# Patient Record
Sex: FEMALE | Race: ASIAN | NOT HISPANIC OR LATINO
[De-identification: names, ages, dates, MRNs, and addresses within clinical notes are randomized per-mention and may not be internally consistent; named-entity substitution may affect disease eponyms.]

---

## 2023-11-29 PROBLEM — Z00.00 ENCOUNTER FOR PREVENTIVE HEALTH EXAMINATION: Status: ACTIVE | Noted: 2023-11-29

## 2023-12-01 ENCOUNTER — APPOINTMENT (OUTPATIENT)
Dept: ANTEPARTUM | Facility: CLINIC | Age: 33
End: 2023-12-01
Payer: MEDICAID

## 2023-12-01 ENCOUNTER — ASOB RESULT (OUTPATIENT)
Age: 33
End: 2023-12-01

## 2023-12-01 PROCEDURE — 76813 OB US NUCHAL MEAS 1 GEST: CPT | Mod: 59

## 2023-12-01 PROCEDURE — 76801 OB US < 14 WKS SINGLE FETUS: CPT

## 2024-01-19 ENCOUNTER — ASOB RESULT (OUTPATIENT)
Age: 34
End: 2024-01-19

## 2024-01-19 ENCOUNTER — APPOINTMENT (OUTPATIENT)
Dept: ANTEPARTUM | Facility: CLINIC | Age: 34
End: 2024-01-19
Payer: MEDICAID

## 2024-01-19 PROCEDURE — 76805 OB US >/= 14 WKS SNGL FETUS: CPT

## 2024-06-08 ENCOUNTER — INPATIENT (INPATIENT)
Facility: HOSPITAL | Age: 34
LOS: 4 days | Discharge: HOME | DRG: 951 | End: 2024-06-13
Attending: OBSTETRICS & GYNECOLOGY | Admitting: OBSTETRICS & GYNECOLOGY
Payer: MEDICAID

## 2024-06-08 VITALS
WEIGHT: 154.1 LBS | DIASTOLIC BLOOD PRESSURE: 63 MMHG | OXYGEN SATURATION: 97 % | SYSTOLIC BLOOD PRESSURE: 102 MMHG | TEMPERATURE: 98 F | HEART RATE: 90 BPM | HEIGHT: 60 IN | RESPIRATION RATE: 18 BRPM

## 2024-06-08 DIAGNOSIS — Z34.80 ENCOUNTER FOR SUPERVISION OF OTHER NORMAL PREGNANCY, UNSPECIFIED TRIMESTER: ICD-10-CM

## 2024-06-08 DIAGNOSIS — O26.899 OTHER SPECIFIED PREGNANCY RELATED CONDITIONS, UNSPECIFIED TRIMESTER: ICD-10-CM

## 2024-06-08 LAB
APTT BLD: 26.2 SEC — SIGNIFICANT CHANGE UP (ref 24.5–35.6)
BASOPHILS # BLD AUTO: 0.03 K/UL — SIGNIFICANT CHANGE UP (ref 0–0.2)
BASOPHILS NFR BLD AUTO: 0.4 % — SIGNIFICANT CHANGE UP (ref 0–2)
BLD GP AB SCN SERPL QL: SIGNIFICANT CHANGE UP
EOSINOPHIL # BLD AUTO: 0.14 K/UL — SIGNIFICANT CHANGE UP (ref 0–0.5)
EOSINOPHIL NFR BLD AUTO: 1.9 % — SIGNIFICANT CHANGE UP (ref 0–6)
FIBRINOGEN PPP-MCNC: 356 MG/DL — SIGNIFICANT CHANGE UP (ref 200–475)
HCT VFR BLD CALC: 37.2 % — SIGNIFICANT CHANGE UP (ref 34.5–45)
HGB BLD-MCNC: 12.4 G/DL — SIGNIFICANT CHANGE UP (ref 11.5–15.5)
IMM GRANULOCYTES NFR BLD AUTO: 1.2 % — HIGH (ref 0–0.9)
INR BLD: 0.88 RATIO — SIGNIFICANT CHANGE UP (ref 0.85–1.18)
LYMPHOCYTES # BLD AUTO: 1.56 K/UL — SIGNIFICANT CHANGE UP (ref 1–3.3)
LYMPHOCYTES # BLD AUTO: 21.5 % — SIGNIFICANT CHANGE UP (ref 13–44)
MCHC RBC-ENTMCNC: 30.5 PG — SIGNIFICANT CHANGE UP (ref 27–34)
MCHC RBC-ENTMCNC: 33.3 GM/DL — SIGNIFICANT CHANGE UP (ref 32–36)
MCV RBC AUTO: 91.6 FL — SIGNIFICANT CHANGE UP (ref 80–100)
MONOCYTES # BLD AUTO: 0.42 K/UL — SIGNIFICANT CHANGE UP (ref 0–0.9)
MONOCYTES NFR BLD AUTO: 5.8 % — SIGNIFICANT CHANGE UP (ref 2–14)
NEUTROPHILS # BLD AUTO: 5.02 K/UL — SIGNIFICANT CHANGE UP (ref 1.8–7.4)
NEUTROPHILS NFR BLD AUTO: 69.2 % — SIGNIFICANT CHANGE UP (ref 43–77)
NRBC # BLD: 0 /100 WBCS — SIGNIFICANT CHANGE UP (ref 0–0)
PLATELET # BLD AUTO: 167 K/UL — SIGNIFICANT CHANGE UP (ref 150–400)
PROTHROM AB SERPL-ACNC: 10.1 SEC — SIGNIFICANT CHANGE UP (ref 9.5–13)
RBC # BLD: 4.06 M/UL — SIGNIFICANT CHANGE UP (ref 3.8–5.2)
RBC # FLD: 14.5 % — SIGNIFICANT CHANGE UP (ref 10.3–14.5)
WBC # BLD: 7.26 K/UL — SIGNIFICANT CHANGE UP (ref 3.8–10.5)
WBC # FLD AUTO: 7.26 K/UL — SIGNIFICANT CHANGE UP (ref 3.8–10.5)

## 2024-06-08 RX ORDER — CHLORHEXIDINE GLUCONATE 213 G/1000ML
1 SOLUTION TOPICAL DAILY
Refills: 0 | Status: DISCONTINUED | OUTPATIENT
Start: 2024-06-08 | End: 2024-06-10

## 2024-06-08 RX ORDER — CITRIC ACID/SODIUM CITRATE 300-500 MG
15 SOLUTION, ORAL ORAL EVERY 6 HOURS
Refills: 0 | Status: DISCONTINUED | OUTPATIENT
Start: 2024-06-08 | End: 2024-06-10

## 2024-06-08 RX ORDER — SODIUM CHLORIDE 9 MG/ML
1000 INJECTION, SOLUTION INTRAVENOUS
Refills: 0 | Status: DISCONTINUED | OUTPATIENT
Start: 2024-06-08 | End: 2024-06-10

## 2024-06-08 RX ORDER — OXYTOCIN 10 UNIT/ML
333.33 VIAL (ML) INJECTION
Qty: 20 | Refills: 0 | Status: COMPLETED | OUTPATIENT
Start: 2024-06-08

## 2024-06-08 NOTE — OB RN PATIENT PROFILE - FALL HARM RISK - UNIVERSAL INTERVENTIONS
Pt call requesting medication clearance because pt. Have a dentist frederick. This coming Thursday 03/16/2023    septocaine with epinephrine  Mepivacaine with  Epinephrine    Esperanza Hernandez Dentist    [address] [place] [address] [address] [facility]  [phone]  Fax [phone]    Please send the clearance before the frederick. Date. Bed in lowest position, wheels locked, appropriate side rails in place/Call bell, personal items and telephone in reach/Instruct patient to call for assistance before getting out of bed or chair/Non-slip footwear when patient is out of bed/[place] to call system/Physically safe environment - no spills, clutter or unnecessary equipment/Purposeful Proactive Rounding/Room/bathroom lighting operational, light cord in reach

## 2024-06-08 NOTE — OB PROVIDER H&P - HISTORY OF PRESENT ILLNESS
32 YO  at 40w5d LMP: 24 CORA: 6/3/24 presents for scheduled IOL due to late term pregnancy.  Pt notes +FM. Denies VB, LOF, CTX, HA, vision changes, Chest pain, SOB, epigastric pain, edema, N/V/D, or RUQ pain.    Prenatal care: [facility]   Complicated by   1. Anemia   - Pt taking PO iron    History  OBHx: Denies  GYNHx: Denies fibroids, cysts, STDs, or abnormal pap smears  PMH: Denies  Meds: PNV, iron   PSHx: Denies  Allergies: NKA  Social: Denies tobacco, EtOH, and drug use  Psych: Denies anxiety, depression, PTSD, or previous suicide attempts. Pt feels safe at home.

## 2024-06-08 NOTE — OB PROVIDER H&P - NSHPPHYSICALEXAM_GEN_ALL_CORE
Vital Signs Last 24 Hrs  T(C): 36.8 (08 Jun 2024 10:57), Max: 36.8 (08 Jun 2024 10:57)  T(F): 98.2 (08 Jun 2024 10:57), Max: 98.2 (08 Jun 2024 10:57)  HR: 90 (08 Jun 2024 10:57) (90 - 90)  BP: 102/63 (08 Jun 2024 10:57) (102/63 - 102/63)  BP(mean): --  RR: 18 (08 Jun 2024 10:57) (18 - 18)  SpO2: 97% (08 Jun 2024 10:57) (97% - 97%)    Parameters below as of 08 Jun 2024 10:57  Patient On (Oxygen Delivery Method): room air      General: Pt resting comfortably, NAD  Abd: Gravid, soft, non-tender  Ext: Soft, non-tender, no edema  SVE: 0/0/-3   NST: Baseline 140, moderate variability, + accels, no decels   TOCO: No contractions  Sono: Bedside sono shows vertex position

## 2024-06-08 NOTE — OB PROVIDER H&P - ASSESSMENT
32 yo f  at 40w5d admitted for MIOL due to late term pregnancy, pt is stable     - NST reviewed   - Continuous maternal and fetal monitoring  - Admission labs ordered   - Informed consent obtained at the bedside by Dr. Cui  - Pain management options discussed   - Po cytotec protocol     D/W Dr. Galindo

## 2024-06-08 NOTE — OB RN PATIENT PROFILE - FUNCTIONAL ASSESSMENT - BASIC MOBILITY 3.
[place]msler grid at home. MVI/AREDS discussed. Patient to call if any changes in vision or grid card. 4 = No assist / stand by assistance

## 2024-06-08 NOTE — OB RN PATIENT PROFILE - NS_ADMITVITALS_OBGYN_ALL_OB_DT
Anesthesia Type: 1% lidocaine with epinephrine and a 1:10 solution of 8.4% sodium bicarbonate 08-Jun-2024 10:30

## 2024-06-09 LAB
ABO RH CONFIRMATION: SIGNIFICANT CHANGE UP
RUBV IGG SER-ACNC: 4.3 INDEX — SIGNIFICANT CHANGE UP
RUBV IGG SER-IMP: POSITIVE — SIGNIFICANT CHANGE UP
T PALLIDUM AB TITR SER: NEGATIVE — SIGNIFICANT CHANGE UP

## 2024-06-09 RX ORDER — OXYTOCIN 10 UNIT/ML
VIAL (ML) INJECTION
Qty: 30 | Refills: 0 | Status: DISCONTINUED | OUTPATIENT
Start: 2024-06-09 | End: 2024-06-10

## 2024-06-09 RX ORDER — SODIUM CHLORIDE 9 MG/ML
500 INJECTION, SOLUTION INTRAVENOUS
Refills: 0 | Status: DISCONTINUED | OUTPATIENT
Start: 2024-06-09 | End: 2024-06-09

## 2024-06-09 RX ADMIN — Medication 2 MILLIUNIT(S)/MIN: at 22:44

## 2024-06-09 RX ADMIN — SODIUM CHLORIDE 125 MILLILITER(S): 9 INJECTION, SOLUTION INTRAVENOUS at 01:35

## 2024-06-09 RX ADMIN — SODIUM CHLORIDE 125 MILLILITER(S): 9 INJECTION, SOLUTION INTRAVENOUS at 13:22

## 2024-06-09 RX ADMIN — SODIUM CHLORIDE 125 MILLILITER(S): 9 INJECTION, SOLUTION INTRAVENOUS at 07:02

## 2024-06-09 NOTE — OB PROVIDER LABOR PROGRESS NOTE - NS_SUBJECTIVE/OBJECTIVE_OBGYN_ALL_OB_FT
Patient states she is doing well. Agreeable to continuing vaginal cytotec    Vital Signs Last 24 Hrs  T(C): 37.4 (09 Jun 2024 19:49), Max: 37.4 (09 Jun 2024 19:47)  T(F): 99.3 (09 Jun 2024 19:49), Max: 99.3 (09 Jun 2024 19:47)  HR: 75 (09 Jun 2024 01:21) (75 - 75)  BP: 109/64 (09 Jun 2024 01:21) (109/64 - 109/64)  BP(mean): --  RR: 18 (09 Jun 2024 01:21) (18 - 18)  SpO2: 94% (09 Jun 2024 01:21) (94% - 94%)    Parameters below as of 09 Jun 2024 01:21  Patient On (Oxygen Delivery Method): room air    Tracing: baseline 145 bpm, moderate variability, + accels , no decels   toco: q4 Patient states she is doing well. Agreeable to continuing vaginal cytotec    Vital Signs Last 24 Hrs  T(C): 37.4 (09 Jun 2024 19:49), Max: 37.4 (09 Jun 2024 19:47)  T(F): 99.3 (09 Jun 2024 19:49), Max: 99.3 (09 Jun 2024 19:47)  HR: 75 (09 Jun 2024 01:21) (75 - 75)  BP: 109/64 (09 Jun 2024 01:21) (109/64 - 109/64)  BP(mean): --  RR: 18 (09 Jun 2024 01:21) (18 - 18)  SpO2: 94% (09 Jun 2024 01:21) (94% - 94%)    Parameters below as of 09 Jun 2024 01:21  Patient On (Oxygen Delivery Method): room air    Tracing: baseline 140 bpm, moderate variability, no decels   toco: q4

## 2024-06-09 NOTE — OB PROVIDER LABOR PROGRESS NOTE - NS_SUBJECTIVE/OBJECTIVE_OBGYN_ALL_OB_FT
patient seen at bedside  pain well controlled.  bp 97/81 hr 59  sve: external oz 2cm, internal closed, 70%,-3

## 2024-06-09 NOTE — OB PROVIDER LABOR PROGRESS NOTE - NS_SUBJECTIVE/OBJECTIVE_OBGYN_ALL_OB_FT
Patient evaluated at bedside, resting comfortably with epidural in place   Denies any complaints at this time   Discussed cervical ripening balloon, answered patient questions     VE deferred at this time   Vital signs stable

## 2024-06-09 NOTE — OB PROVIDER LABOR PROGRESS NOTE - NS_SUBJECTIVE/OBJECTIVE_OBGYN_ALL_OB_FT
Patient evaluated at bedside, resting comfortably with epidural   Denies complaints at this time    VE: 2/70/-3    Attempted cervical ripening balloon   Unable to place due to tight internal os   Patient tolerate procedure well

## 2024-06-09 NOTE — OB PROVIDER LABOR PROGRESS NOTE - NS_SUBJECTIVE/OBJECTIVE_OBGYN_ALL_OB_FT
Patient resting comfortably     SVE: 2/70/-3  Vaginal cytotec #1 placed  Patient tolerated procedure well

## 2024-06-09 NOTE — OB PROVIDER LABOR PROGRESS NOTE - NS_SUBJECTIVE/OBJECTIVE_OBGYN_ALL_OB_FT
Patient seen resting comfortably.  No complaints.  Does not require pain management at this time.     VE: Deferred

## 2024-06-09 NOTE — OB PROVIDER LABOR PROGRESS NOTE - NS_SUBJECTIVE/OBJECTIVE_OBGYN_ALL_OB_FT
Patient evaluated at bedside, resting comfortably   Denies any complaints at this time     VE deferred at this time   Vital signs stable

## 2024-06-10 LAB
APTT BLD: 30.1 SEC — SIGNIFICANT CHANGE UP (ref 24.5–35.6)
HCT VFR BLD CALC: 25.3 % — LOW (ref 34.5–45)
HGB BLD-MCNC: 8.5 G/DL — LOW (ref 11.5–15.5)
INR BLD: 1.01 RATIO — SIGNIFICANT CHANGE UP (ref 0.85–1.18)
MCHC RBC-ENTMCNC: 31.5 PG — SIGNIFICANT CHANGE UP (ref 27–34)
MCHC RBC-ENTMCNC: 33.6 GM/DL — SIGNIFICANT CHANGE UP (ref 32–36)
MCV RBC AUTO: 93.7 FL — SIGNIFICANT CHANGE UP (ref 80–100)
NRBC # BLD: 0 /100 WBCS — SIGNIFICANT CHANGE UP (ref 0–0)
PLATELET # BLD AUTO: 138 K/UL — LOW (ref 150–400)
PROTHROM AB SERPL-ACNC: 11.5 SEC — SIGNIFICANT CHANGE UP (ref 9.5–13)
RBC # BLD: 2.7 M/UL — LOW (ref 3.8–5.2)
RBC # FLD: 14.3 % — SIGNIFICANT CHANGE UP (ref 10.3–14.5)
WBC # BLD: 13.26 K/UL — HIGH (ref 3.8–10.5)
WBC # FLD AUTO: 13.26 K/UL — HIGH (ref 3.8–10.5)

## 2024-06-10 RX ORDER — DIPHENHYDRAMINE HCL 50 MG
25 CAPSULE ORAL EVERY 6 HOURS
Refills: 0 | Status: DISCONTINUED | OUTPATIENT
Start: 2024-06-10 | End: 2024-06-13

## 2024-06-10 RX ORDER — OXYTOCIN 10 UNIT/ML
333.33 VIAL (ML) INJECTION
Qty: 20 | Refills: 0 | Status: DISCONTINUED | OUTPATIENT
Start: 2024-06-10 | End: 2024-06-13

## 2024-06-10 RX ORDER — CITRIC ACID/SODIUM CITRATE 300-500 MG
20 SOLUTION, ORAL ORAL ONCE
Refills: 0 | Status: DISCONTINUED | OUTPATIENT
Start: 2024-06-10 | End: 2024-06-13

## 2024-06-10 RX ORDER — KETOROLAC TROMETHAMINE 30 MG/ML
30 SYRINGE (ML) INJECTION EVERY 6 HOURS
Refills: 0 | Status: DISCONTINUED | OUTPATIENT
Start: 2024-06-10 | End: 2024-06-11

## 2024-06-10 RX ORDER — FERROUS SULFATE 325(65) MG
325 TABLET ORAL DAILY
Refills: 0 | Status: DISCONTINUED | OUTPATIENT
Start: 2024-06-11 | End: 2024-06-13

## 2024-06-10 RX ORDER — FAMOTIDINE 10 MG/ML
20 INJECTION INTRAVENOUS ONCE
Refills: 0 | Status: COMPLETED | OUTPATIENT
Start: 2024-06-10 | End: 2024-06-10

## 2024-06-10 RX ORDER — CEFAZOLIN SODIUM 1 G
2000 VIAL (EA) INJECTION ONCE
Refills: 0 | Status: COMPLETED | OUTPATIENT
Start: 2024-06-10 | End: 2024-06-10

## 2024-06-10 RX ORDER — MAGNESIUM HYDROXIDE 400 MG/1
30 TABLET, CHEWABLE ORAL
Refills: 0 | Status: DISCONTINUED | OUTPATIENT
Start: 2024-06-10 | End: 2024-06-13

## 2024-06-10 RX ORDER — SODIUM CHLORIDE 9 MG/ML
500 INJECTION INTRAMUSCULAR; INTRAVENOUS; SUBCUTANEOUS ONCE
Refills: 0 | Status: COMPLETED | OUTPATIENT
Start: 2024-06-10 | End: 2024-06-10

## 2024-06-10 RX ORDER — IBUPROFEN 200 MG
600 TABLET ORAL EVERY 6 HOURS
Refills: 0 | Status: COMPLETED | OUTPATIENT
Start: 2024-06-10 | End: 2025-05-09

## 2024-06-10 RX ORDER — TETANUS TOXOID, REDUCED DIPHTHERIA TOXOID AND ACELLULAR PERTUSSIS VACCINE, ADSORBED 5; 2.5; 8; 8; 2.5 [IU]/.5ML; [IU]/.5ML; UG/.5ML; UG/.5ML; UG/.5ML
0.5 SUSPENSION INTRAMUSCULAR ONCE
Refills: 0 | Status: DISCONTINUED | OUTPATIENT
Start: 2024-06-10 | End: 2024-06-13

## 2024-06-10 RX ORDER — ONDANSETRON 8 MG/1
4 TABLET, FILM COATED ORAL ONCE
Refills: 0 | Status: COMPLETED | OUTPATIENT
Start: 2024-06-10 | End: 2024-06-10

## 2024-06-10 RX ORDER — LANOLIN
1 OINTMENT (GRAM) TOPICAL EVERY 6 HOURS
Refills: 0 | Status: DISCONTINUED | OUTPATIENT
Start: 2024-06-10 | End: 2024-06-13

## 2024-06-10 RX ORDER — OXYCODONE HYDROCHLORIDE 5 MG/1
5 TABLET ORAL
Refills: 0 | Status: COMPLETED | OUTPATIENT
Start: 2024-06-10 | End: 2024-06-17

## 2024-06-10 RX ORDER — OXYCODONE HYDROCHLORIDE 5 MG/1
5 TABLET ORAL ONCE
Refills: 0 | Status: DISCONTINUED | OUTPATIENT
Start: 2024-06-10 | End: 2024-06-13

## 2024-06-10 RX ORDER — ENOXAPARIN SODIUM 100 MG/ML
40 INJECTION SUBCUTANEOUS EVERY 24 HOURS
Refills: 0 | Status: DISCONTINUED | OUTPATIENT
Start: 2024-06-11 | End: 2024-06-13

## 2024-06-10 RX ORDER — OXYTOCIN 10 UNIT/ML
333.33 VIAL (ML) INJECTION
Qty: 20 | Refills: 0 | Status: COMPLETED | OUTPATIENT
Start: 2024-06-10 | End: 2024-06-10

## 2024-06-10 RX ORDER — SIMETHICONE 80 MG/1
80 TABLET, CHEWABLE ORAL EVERY 4 HOURS
Refills: 0 | Status: DISCONTINUED | OUTPATIENT
Start: 2024-06-10 | End: 2024-06-13

## 2024-06-10 RX ORDER — SODIUM CHLORIDE 9 MG/ML
1000 INJECTION, SOLUTION INTRAVENOUS
Refills: 0 | Status: DISCONTINUED | OUTPATIENT
Start: 2024-06-10 | End: 2024-06-13

## 2024-06-10 RX ORDER — HYDROMORPHONE HYDROCHLORIDE 2 MG/ML
0.5 INJECTION INTRAMUSCULAR; INTRAVENOUS; SUBCUTANEOUS ONCE
Refills: 0 | Status: DISCONTINUED | OUTPATIENT
Start: 2024-06-10 | End: 2024-06-10

## 2024-06-10 RX ORDER — AZITHROMYCIN 500 MG/1
500 TABLET, FILM COATED ORAL ONCE
Refills: 0 | Status: COMPLETED | OUTPATIENT
Start: 2024-06-10 | End: 2024-06-10

## 2024-06-10 RX ORDER — ACETAMINOPHEN 500 MG
1000 TABLET ORAL ONCE
Refills: 0 | Status: COMPLETED | OUTPATIENT
Start: 2024-06-10 | End: 2024-06-10

## 2024-06-10 RX ORDER — ACETAMINOPHEN 500 MG
975 TABLET ORAL
Refills: 0 | Status: DISCONTINUED | OUTPATIENT
Start: 2024-06-10 | End: 2024-06-13

## 2024-06-10 RX ORDER — FOLIC ACID 0.8 MG
1 TABLET ORAL DAILY
Refills: 0 | Status: DISCONTINUED | OUTPATIENT
Start: 2024-06-11 | End: 2024-06-13

## 2024-06-10 RX ADMIN — SODIUM CHLORIDE 500 MILLILITER(S): 9 INJECTION INTRAMUSCULAR; INTRAVENOUS; SUBCUTANEOUS at 18:00

## 2024-06-10 RX ADMIN — Medication 15 MILLILITER(S): at 11:33

## 2024-06-10 RX ADMIN — ONDANSETRON 4 MILLIGRAM(S): 8 TABLET, FILM COATED ORAL at 12:24

## 2024-06-10 RX ADMIN — HYDROMORPHONE HYDROCHLORIDE 0.5 MILLIGRAM(S): 2 INJECTION INTRAMUSCULAR; INTRAVENOUS; SUBCUTANEOUS at 18:24

## 2024-06-10 RX ADMIN — SODIUM CHLORIDE 125 MILLILITER(S): 9 INJECTION, SOLUTION INTRAVENOUS at 01:24

## 2024-06-10 RX ADMIN — Medication 100 MILLIGRAM(S): at 13:36

## 2024-06-10 RX ADMIN — FAMOTIDINE 20 MILLIGRAM(S): 10 INJECTION INTRAVENOUS at 11:33

## 2024-06-10 RX ADMIN — CHLORHEXIDINE GLUCONATE 1 APPLICATION(S): 213 SOLUTION TOPICAL at 11:33

## 2024-06-10 RX ADMIN — Medication 30 MILLIGRAM(S): at 15:00

## 2024-06-10 RX ADMIN — AZITHROMYCIN 255 MILLIGRAM(S): 500 TABLET, FILM COATED ORAL at 12:07

## 2024-06-10 RX ADMIN — HYDROMORPHONE HYDROCHLORIDE 0.5 MILLIGRAM(S): 2 INJECTION INTRAMUSCULAR; INTRAVENOUS; SUBCUTANEOUS at 18:03

## 2024-06-10 RX ADMIN — HYDROMORPHONE HYDROCHLORIDE 0.5 MILLIGRAM(S): 2 INJECTION INTRAMUSCULAR; INTRAVENOUS; SUBCUTANEOUS at 20:36

## 2024-06-10 RX ADMIN — Medication 1000 MILLIGRAM(S): at 17:32

## 2024-06-10 RX ADMIN — Medication 1000 MILLIUNIT(S)/MIN: at 16:50

## 2024-06-10 RX ADMIN — Medication 0.2 MILLIGRAM(S): at 17:58

## 2024-06-10 RX ADMIN — SODIUM CHLORIDE 125 MILLILITER(S): 9 INJECTION, SOLUTION INTRAVENOUS at 04:51

## 2024-06-10 RX ADMIN — Medication 30 MILLIGRAM(S): at 23:42

## 2024-06-10 RX ADMIN — Medication 400 MILLIGRAM(S): at 16:50

## 2024-06-10 NOTE — CHART NOTE - NSCHARTNOTEFT_GEN_A_CORE
MARLON THOMPSON Event Note     Pt reevaluated; Anesthesia  giving ephedrine for hypotension  90/70's, and agrees to give diluaded .5 for pain  reevaluated at bedside; fundus below umbilicus well contracted  and lochia now min to moderate  cbc dropped 8.5/25  continue blood transfusion    T(C): 37.4 (06-10-24 @ 15:30), Max: 37.4 (06-09-24 @ 19:47)  HR: --  BP: 112/97 (06-10-24 @ 16:00) (75/47 - 112/97)  bp 98/71   RR: 16 (06-10-24 @ 16:00) (16 - 16)  SpO2: 100% (06-10-24 @ 16:00) (100% - 100%)  Wt(kg): --    Gen: A&Ox 3, NAD  Abdomen: +BS; Soft, nontender, ND; Fundus firm below umbilicus  Gyn: Min lochia  Ext: Nontender, DTRS 2+, no worsening edema     notified   notified  stevie

## 2024-06-10 NOTE — OB PROVIDER LABOR PROGRESS NOTE - ASSESSMENT
32 yo  @ 40.6 weeks, miol for post term     - continue close maternal and fetal monitoring   - pain management with epidural   - continue PO cytotec protocol   - possible CRB  - NST reviewed   - Dr. Galindo notified 
34 y/o  @ 40 weeks 6 days miol post dates s/p PO cytotec , failed balloon, vaginal cytotec.   CAT I     -continue to monitor vitals   - continue fetal/maternal monitoring   - epidural management per anesthesia   - continue vaginal cytotec   -  d/w Dr. Galindo   
Patient undergoing MIOL for post dates    -Continue monitoring fht, toco, vitals  -Pain management as needed  -PO cytotec protocol  -Continue current care    Dr Galindo aware 
32 yo  @ 40.6 weeks, miol for post term     - continue close maternal and fetal monitoring   - pain management per patient request  - continue PO cytotec protocol   - NST reviewed   - Dr. Galindo notified 
34 yo f  at 40w5d admitted for MIOL due to late term pregnancy, pt is stable     - NST reviewed   - maternal and fetal monitoring  - Pain management options discussed   - Po cytotec protocol     D/W Dr. Galindo
a/p siup @ 40w6d miol for post dates  - d/w Dr Galindo, will d/c vaginal cytotec protocol and start pitocin augmentation once cat 1 tracing.   - 2 units prbcs available  - lateral side  - D5LR  - continuous maternal/fetal monitoring  nst reviewed by Dr Henry, house attending
nst cat I  continue po cytotec  nst reviewed with dr.Rimarachin hubbard
a/p siup @ 40w6d miol for post dates  - pitocin augmentation per protocol  - 2 units prbcs available  - lateral side  - D5LR  - continuous maternal/fetal monitoring  nst reviewed by Dr Henry, kim attending
34 yo  @ 40.6 weeks, miol for post term     - continue close maternal and fetal monitoring   - pain management with epidural   - continue PO cytotec protocol   - possible re-attempt CRB  - re-evaluate after PO cytotec finished   - NST reviewed   - discussed with Dr. Galindo 
a/p siup @ 40w6d miol for post dates  -  continue pitocin augmentation per protocol  - 2 units prbcs available  - lateral side/peanut ball  - D5LR  - continuous maternal/fetal monitoring  nst reviewed by kim Coyne attending  d/w Dr Galindo
cat I   contineu pitocin titration  pt seen at bedside with dr.Spryopolous Finley
Patient undergoing mIOL for post dates    -Continue monitoring fht, toco, vitals  -Pain management as needed  -Continue PO cytotec   -Continue current care    Dr Galindo aware 
34 yo  @ 40.6 weeks, miol for post term   Patient s/p PO cytotec protocol   Minimal cervical dilation   s/p vaginal cytotec #1   Will reevaluate after 2-3 cytotec     - continue close maternal and fetal monitoring   - pain management with epidural   - vaginal cytotec   - NST reviewed   - Dr. Galindo aware

## 2024-06-10 NOTE — OB PROVIDER DELIVERY SUMMARY - NSPROVIDERDELIVERYNOTE_OBGYN_ALL_OB_FT
ml  IV 2000 ml   urine output 400 ml clear at the end of the procedure  Ascites  right and left adnexa normal   uterus gravid , intact,  alive male fetus, cephalic, left OP position, clear fluid, apgar 9/9,   delayed cord clamping done  Placenta complete looking , 3 vessel cord  uterus contracted  specimen : cord gas , cord gases

## 2024-06-10 NOTE — OB PROVIDER LABOR PROGRESS NOTE - NS_OBIHICONTRACTIONPATTERNDETAILS_OBGYN_ALL_OB_FT
q 2 minutes
q4-5
Irregular contractions
q 2-3 minutes
q irregular
q3
no contrations
q 3 minutes
toco q irregular
toco q irregular
q 3-5 minutes
Contractions q 2-3 mins

## 2024-06-10 NOTE — OB PROVIDER LABOR PROGRESS NOTE - NSVAGINALEXAM_OBGYN_ALL_OB_DT
09-Jun-2024 06:18
09-Jun-2024 13:10
10-Oliver-2024
09-Jun-2024 18:30
10-Oliver-2024
10-Oliver-2024 09:17

## 2024-06-10 NOTE — OB RN DELIVERY SUMMARY - BABY A: APGAR 5 MIN RESP RATE, DELIVERY
Outpatient Physical Therapy Ortho Treatment Note  [facility]     Patient Name: Gayatri Bianchi  : 1953  MRN: [id number]  Today's Date: 2018      Visit Date: 2018    Visit Dx:    ICD-10-CM ICD-9-CM   1. Heel pain, chronic, right M79.671 729.5    G89.29 338.29       There is no problem list on file for this patient.       No past medical history on file.     Past Surgical History:   Procedure Laterality Date   • BREAST BIOPSY     • HYSTERECTOMY     • OOPHORECTOMY Right                              PT Assessment/Plan     Row Name 18 1214          PT Assessment    Assessment Comments Her R gastroc muscle tone has improved and today I performed measurements of the B gastro muscle girths 6 inches from the medial femoral condyles and her R LE was 1/16th of an inch bigger.  -EC        PT Plan    PT Plan Comments Review all goals and place on hold as she will be out of town for two weeks, perform a progress note in case she returns post 2 week hiatus.  -EC       User Key  (r) = Recorded By, (t) = Taken By, (c) = Cosigned By    Initials Name Provider Type    EC Germán Pérez PTA Physical Therapy Assistant                Modalities     Row Name 18 0900             Subjective Pain    Able to rate subjective pain? yes  -EC      Pre-Treatment Pain Level 0  -EC         ELECTRICAL STIMULATION    Attended/Unattended Attended  -EC      Stimulation Type --   Laser stim Chronic Inflammation   -EC      Location/Electrode Placement/Other R medial/lateral ankle and achilles tendon   -EC      16172 - PT Electrical Stimulation (Manual) Minutes 15  -EC        User Key  (r) = Recorded By, (t) = Taken By, (c) = Cosigned By    Initials Name Provider Type    EC Germán Pérez PTA Physical Therapy Assistant                Exercises     Row Name 18 0900             Subjective Comments    Subjective Comments She reports she had a little pain after wearing sandals the other day  -EC         Subjective  Pain    Able to rate subjective pain? yes  -EC      Pre-Treatment Pain Level 0  -EC      Post-Treatment Pain Level 0  -EC         Total Minutes    71335 - PT Manual Therapy Minutes 30  -EC        User Key  (r) = Recorded By, (t) = Taken By, (c) = Cosigned By    Initials Name Provider Type    WILVER Pérez RICCI Physical Therapy Assistant                        Manual Rx (last 36 hours)      Manual Treatments     Row Name 08/01/18 0900             Total Minutes    18881 - PT Manual Therapy Minutes 30  -EC        User Key  (r) = Recorded By, (t) = Taken By, (c) = Cosigned By    Initials Name Provider Type    WILVER Pérez, RICCI Physical Therapy Assistant                PT OP Goals     Row Name 08/01/18 0900          PT Short Term Goals    STG Date to Achieve 07/27/18  -EC     STG 1 Patient will be able to maintain SLS on firm surface with eyes closed for >30 seconds without trendelenburg sign.  -EC     STG 1 Progress Ongoing  -EC     STG 2 Patient will demonstrate at least 4+/5 strength in hip abduction, bilaterally.  -EC     STG 2 Progress Ongoing  -EC     STG 3 Patient will score 85% or higher on the Lower Extremity Functional Scale.  -EC     STG 3 Progress Ongoing  -EC        Long Term Goals    LTG Date to Achieve 08/12/18  -EC     LTG 1 Patient will be independent in a comprehensive HEP.  -EC     LTG 1 Progress Ongoing  -EC     LTG 1 Progress Comments verbalized compliance  -EC     LTG 2 Patient will be able to ascend/descend a flight of stairs with reciprocal pattern without right heel pain.  -EC     LTG 2 Progress Ongoing  -EC     LTG 3 Patient will ambulate without right hip drop/lateral pelvic drift with midstance   -EC     LTG 3 Progress Ongoing  -EC     LTG 4 Patient will be able to perform 20 (R) active heel raises through full range with symptoms <3/10  -EC     LTG 4 Progress Ongoing  -EC     LTG 5 Patient will be able to negotiate inclines/declines on even/uneven terrain with (R) heel symptoms  <3/10  -EC     LTG 5 Progress Ongoing  -EC       User Key  (r) = Recorded By, (t) = Taken By, (c) = Cosigned By    Initials Name Provider Type    EC Germán Pérez PTA Physical Therapy Assistant          Therapy Education  Given: HEP, Symptoms/condition management  Program: Reinforced  How Provided: Verbal  Provided to: Patient  Level of Understanding: Verbalized              Time Calculation:      Therapy Suggested Charges     Code   Minutes Charges    70752 (CPT®) Hc Pt Neuromusc Re Education Ea 15 Min      51478 (CPT®) Hc Pt Ther Proc Ea 15 Min      64291 (CPT®) Hc Gait Training Ea 15 Min      62766 (CPT®) Hc Pt Therapeutic Act Ea 15 Min      47277 (CPT®) Hc Pt Manual Therapy Ea 15 Min 30 2    22133 (CPT®) Hc Pt Ther Massage- Per 15 Min      72513 (CPT®) Hc Pt Iontophoresis Ea 15 Min      69634 (CPT®) Hc Pt Elec Stim Ea-Per 15 Min 15 1    91369 (CPT®) Hc Pt Ultrasound Ea 15 Min      60343 (CPT®) Hc Pt Self Care/Mgmt/Train Ea 15 Min      Total  45 3        Therapy Charges for Today     Code Description Service Date Service Provider Modifiers Qty    02478078164 HC PT MANUAL THERAPY EA 15 MIN 8/1/2018 Germán Pérez PTA GP 2    98497890797 HC PT ELEC STIM EA-PER 15 MIN 8/1/2018 Germán Pérez PTA GP 1                    Germán Péerz PTA  8/1/2018      (2) good, crying

## 2024-06-10 NOTE — OB NEONATOLOGY/PEDIATRICIAN DELIVERY SUMMARY - NSPEDSNEONOTESA_OBGYN_ALL_OB_FT
Neonatologist, myself, called to the unscheduled, primary  of a 32yo  in the setting of category II fetal heart rate tracing and failure to progress. Estimated gestational age 41 weeks, 1 day. Maternal Serologies: O+/Ab-/GBS-/HIV-/HepB-/RPR-/Rubella Immune. Artificial rupture of membranes was at 6/10 at 0500 and significant for clear fluid. Delivery significant for a live, viable, hay[place] male. Baby cried at delivery, was bulb suctioned by OB, cord clamping delayed approximately 30 seconds. Baby was subsequently brought to the radiant warmer by OB where Peds assumed care. Baby was warmed/dried/stimulated, bulb suction of the nose and mouth performed as needed. Baby did not require supplemental O2. APGARs 9/9 at 1 and 5 minutes of life respectively. Baby stable to remain on  Nursery Service to continue couplet care with mother. EOS 0.42.

## 2024-06-10 NOTE — OB PROVIDER DELIVERY SUMMARY - NSSELHIDDEN_OBGYN_ALL_OB_FT
[NS_DeliveryAttending1_OBGYN_ALL_OB_FT:[id number]==],[NS_DeliveryAssist1_OBGYN_ALL_OB_FT:[id number]]

## 2024-06-10 NOTE — CHART NOTE - NSCHARTNOTEFT_GEN_A_CORE
MARLON THOMPSON Event Note     RN called to evaluate lochia.     T(C): 37.4 (06-10-24 @ 15:30), Max: 37.4 (06-09-24 @ 19:47)  HR: --  BP: 112/97 (06-10-24 @ 16:00) (75/47 - 112/97)  RR: 16 (06-10-24 @ 16:00) (16 - 16)  SpO2: 100% (06-10-24 @ 16:00) (100% - 100%)  Wt(kg): --    Gen: A&Ox 3, NAD  Abdomen: +BS; Soft, nontender, ND; Fundus at umbilicus dressing c/d/i  Gyn: clots on reji already, manual evacuation of large clots  Ext: Nontender, DTRS 2+, no worsening edema    QBL 2 liters total     A/P: 33y year old Female pod #0 s/p c/s with pph  code fusion called  packed cells  methergine/cytotec ordered   called   at bedside  [facility]

## 2024-06-10 NOTE — OB PROVIDER LABOR PROGRESS NOTE - NS_OBIHIFHRDETAILS_OBGYN_ALL_OB_FT
FHT: baseline: 140, moderate variability, + accels, no decels
fh baseline 140 min-moderate variability +  [id number]
Baseline 140, moderate variability, + accels, no decels
Baseline 140, moderate variability, no decels
cat I
fh baseline 145 min-mod variability, occasional variable decels that spontaneously resolved
FHT: baseline: 140, moderate variability, + accels, no decels
FHT: baseline: 140, moderate variability, + accels, no decels
Baseline 135, moderate variability +accels, no decels
FHT: baseline: 140, minimal to moderate variability, + accels, no decels
cat I
fh baseline 140 min-moderate variability + accels

## 2024-06-10 NOTE — OB PROVIDER DELIVERY SUMMARY - NSURGENCYA_OBGYN_ALL_OB
Reason for Call: Question   Detailed comments: Pt saw Dr. Hills today, question on the recommendation he gave regarding the imodium.     Phone Number Patient can be reached at: Home number on file [phone] (home)    Best Time: Any    Can we leave a detailed message on this number? YES    Call taken on 8/7/2017 at 4:53 PM by Demetrice Torres       Non Scheduled Non Urgent

## 2024-06-10 NOTE — OB RN INTRAOPERATIVE NOTE - NSSELHIDDEN_OBGYN_ALL_OB_FT
[NS_DeliveryAttending1_OBGYN_ALL_OB_FT:[id number]==],[NS_DeliveryAssist1_OBGYN_ALL_OB_FT:[id number]]

## 2024-06-10 NOTE — OB PROVIDER LABOR PROGRESS NOTE - NS_SUBJECTIVE/OBJECTIVE_OBGYN_ALL_OB_FT
patient resting comfortably  pitocin at 8mu  epidural in place  sve 3.5/70/-3 arom clear, iupc inserted

## 2024-06-11 ENCOUNTER — TRANSCRIPTION ENCOUNTER (OUTPATIENT)
Age: 34
End: 2024-06-11

## 2024-06-11 DIAGNOSIS — D62 ACUTE POSTHEMORRHAGIC ANEMIA: ICD-10-CM

## 2024-06-11 LAB
BASOPHILS # BLD AUTO: 0.04 K/UL — SIGNIFICANT CHANGE UP (ref 0–0.2)
BASOPHILS NFR BLD AUTO: 0.3 % — SIGNIFICANT CHANGE UP (ref 0–2)
EOSINOPHIL # BLD AUTO: 0.19 K/UL — SIGNIFICANT CHANGE UP (ref 0–0.5)
EOSINOPHIL NFR BLD AUTO: 1.6 % — SIGNIFICANT CHANGE UP (ref 0–6)
HCT VFR BLD CALC: 28.2 % — LOW (ref 34.5–45)
HGB BLD-MCNC: 9.7 G/DL — LOW (ref 11.5–15.5)
IMM GRANULOCYTES NFR BLD AUTO: 0.5 % — SIGNIFICANT CHANGE UP (ref 0–0.9)
LYMPHOCYTES # BLD AUTO: 1.85 K/UL — SIGNIFICANT CHANGE UP (ref 1–3.3)
LYMPHOCYTES # BLD AUTO: 15.6 % — SIGNIFICANT CHANGE UP (ref 13–44)
MCHC RBC-ENTMCNC: 30.2 PG — SIGNIFICANT CHANGE UP (ref 27–34)
MCHC RBC-ENTMCNC: 34.4 GM/DL — SIGNIFICANT CHANGE UP (ref 32–36)
MCV RBC AUTO: 87.9 FL — SIGNIFICANT CHANGE UP (ref 80–100)
MONOCYTES # BLD AUTO: 0.66 K/UL — SIGNIFICANT CHANGE UP (ref 0–0.9)
MONOCYTES NFR BLD AUTO: 5.6 % — SIGNIFICANT CHANGE UP (ref 2–14)
NEUTROPHILS # BLD AUTO: 9.09 K/UL — HIGH (ref 1.8–7.4)
NEUTROPHILS NFR BLD AUTO: 76.4 % — SIGNIFICANT CHANGE UP (ref 43–77)
NRBC # BLD: 0 /100 WBCS — SIGNIFICANT CHANGE UP (ref 0–0)
PLATELET # BLD AUTO: 119 K/UL — LOW (ref 150–400)
RBC # BLD: 3.21 M/UL — LOW (ref 3.8–5.2)
RBC # FLD: 16 % — HIGH (ref 10.3–14.5)
WBC # BLD: 11.89 K/UL — HIGH (ref 3.8–10.5)
WBC # FLD AUTO: 11.89 K/UL — HIGH (ref 3.8–10.5)

## 2024-06-11 RX ORDER — FAMOTIDINE 10 MG/ML
20 INJECTION INTRAVENOUS
Refills: 0 | Status: DISCONTINUED | OUTPATIENT
Start: 2024-06-11 | End: 2024-06-13

## 2024-06-11 RX ORDER — IBUPROFEN 200 MG
600 TABLET ORAL EVERY 6 HOURS
Refills: 0 | Status: DISCONTINUED | OUTPATIENT
Start: 2024-06-11 | End: 2024-06-13

## 2024-06-11 RX ORDER — OXYCODONE HYDROCHLORIDE 5 MG/1
5 TABLET ORAL
Refills: 0 | Status: DISCONTINUED | OUTPATIENT
Start: 2024-06-11 | End: 2024-06-13

## 2024-06-11 RX ADMIN — Medication 30 MILLIGRAM(S): at 12:52

## 2024-06-11 RX ADMIN — Medication 975 MILLIGRAM(S): at 02:52

## 2024-06-11 RX ADMIN — Medication 30 MILLIGRAM(S): at 00:30

## 2024-06-11 RX ADMIN — OXYCODONE HYDROCHLORIDE 5 MILLIGRAM(S): 5 TABLET ORAL at 10:03

## 2024-06-11 RX ADMIN — Medication 975 MILLIGRAM(S): at 21:12

## 2024-06-11 RX ADMIN — Medication 975 MILLIGRAM(S): at 16:50

## 2024-06-11 RX ADMIN — Medication 30 MILLIGRAM(S): at 07:00

## 2024-06-11 RX ADMIN — Medication 975 MILLIGRAM(S): at 15:50

## 2024-06-11 RX ADMIN — Medication 975 MILLIGRAM(S): at 04:00

## 2024-06-11 RX ADMIN — SIMETHICONE 80 MILLIGRAM(S): 80 TABLET, CHEWABLE ORAL at 09:03

## 2024-06-11 RX ADMIN — ENOXAPARIN SODIUM 40 MILLIGRAM(S): 100 INJECTION SUBCUTANEOUS at 06:52

## 2024-06-11 RX ADMIN — MAGNESIUM HYDROXIDE 30 MILLILITER(S): 400 TABLET, CHEWABLE ORAL at 21:12

## 2024-06-11 RX ADMIN — OXYCODONE HYDROCHLORIDE 5 MILLIGRAM(S): 5 TABLET ORAL at 09:03

## 2024-06-11 RX ADMIN — Medication 325 MILLIGRAM(S): at 11:52

## 2024-06-11 RX ADMIN — Medication 1 MILLIGRAM(S): at 11:52

## 2024-06-11 RX ADMIN — SIMETHICONE 80 MILLIGRAM(S): 80 TABLET, CHEWABLE ORAL at 02:40

## 2024-06-11 RX ADMIN — Medication 975 MILLIGRAM(S): at 09:03

## 2024-06-11 RX ADMIN — MAGNESIUM HYDROXIDE 30 MILLILITER(S): 400 TABLET, CHEWABLE ORAL at 09:03

## 2024-06-11 RX ADMIN — Medication 30 MILLIGRAM(S): at 05:54

## 2024-06-11 RX ADMIN — Medication 975 MILLIGRAM(S): at 22:00

## 2024-06-11 RX ADMIN — Medication 600 MILLIGRAM(S): at 19:42

## 2024-06-11 RX ADMIN — Medication 30 MILLIGRAM(S): at 11:52

## 2024-06-11 RX ADMIN — Medication 600 MILLIGRAM(S): at 23:16

## 2024-06-11 RX ADMIN — Medication 600 MILLIGRAM(S): at 18:42

## 2024-06-11 RX ADMIN — Medication 975 MILLIGRAM(S): at 10:03

## 2024-06-11 RX ADMIN — FAMOTIDINE 20 MILLIGRAM(S): 10 INJECTION INTRAVENOUS at 18:43

## 2024-06-11 RX ADMIN — Medication 1 TABLET(S): at 11:52

## 2024-06-11 NOTE — DISCHARGE NOTE OB - CARE PLAN
Principal Discharge DX:	 delivery delivered  Assessment and plan of treatment:	Continue breastfeeding.  Motrin as needed for pain.  Ambulate daily.  No heavy lifting or anything per vagina x 6 weeks - no sex, tampons, douching, tub baths, etc.  Follow up in office in 2 weeks for incision check, and then at 6 weeks for postpartum check.  Secondary Diagnosis:	Postoperative anemia due to acute blood loss  Assessment and plan of treatment:	take iron, folic acid, vitamin C, and prenatal vitamins. eat iron fortified food   1

## 2024-06-11 NOTE — DISCHARGE NOTE OB - MEDICATION SUMMARY - MEDICATIONS TO TAKE
I will START or STAY ON the medications listed below when I get home from the hospital:    ibuprofen 600 mg oral tablet  -- 1 tab(s) by mouth every 6 hours as needed for  moderate pain  -- Indication: For as needed for pain    Tylenol Extra Strength 500 mg oral tablet  -- 2 tab(s) by mouth every 6 hours as needed for  mild pain  -- Indication: For as needed for pain    Prenatal Multivitamins with Folic Acid 1 mg oral tablet  -- 1 tab(s) by mouth once a day  -- Indication: For breastfeeding    ferrous sulfate 325 mg (65 mg elemental iron) oral tablet  -- 1 tab(s) by mouth 2 times a day  -- Indication: For Anemia    Colace 100 mg oral capsule  -- 1 cap(s) by mouth once a day  -- Indication: For Stool softener/constipation    simethicone 80 mg oral tablet, chewable  -- 1 tab(s) chewed every 6 hours as needed for gas pain  -- Indication: For Gas pain     Vitamin C 500 mg oral tablet, chewable  -- 1 tab(s) chewed once a day  -- Indication: For Supplement   I will START or STAY ON the medications listed below when I get home from the hospital:    ibuprofen 600 mg oral tablet  -- 1 tab(s) by mouth every 6 hours as needed for  moderate pain  -- Indication: For as needed for pain    Tylenol Extra Strength 500 mg oral tablet  -- 2 tab(s) by mouth every 6 hours as needed for  mild pain  -- Indication: For as needed for pain    betamethasone dipropionate, augmented 0.05% topical cream  -- Apply on skin to affected area 1 to 2 times a day as needed for  itching  -- Indication: For itching    Prenatal Multivitamins with Folic Acid 1 mg oral tablet  -- 1 tab(s) by mouth once a day  -- Indication: For breastfeeding    ferrous sulfate 325 mg (65 mg elemental iron) oral tablet  -- 1 tab(s) by mouth 2 times a day  -- Indication: For Anemia    Colace 100 mg oral capsule  -- 1 cap(s) by mouth once a day  -- Indication: For Stool softener/constipation    simethicone 80 mg oral tablet, chewable  -- 1 tab(s) chewed every 6 hours as needed for gas pain  -- Indication: For Gas pain     Vitamin C 500 mg oral tablet, chewable  -- 1 tab(s) chewed once a day  -- Indication: For Supplement

## 2024-06-11 NOTE — DISCHARGE NOTE OB - CARE PROVIDER_API CALL
Ayaan Galindo  Obstetrics and Gynecology  [address] [place] [address]  Phone: [phone]  Fax: [phone]  Follow Up Time: 2 weeks

## 2024-06-11 NOTE — DISCHARGE NOTE OB - NS MD DC FALL RISK RISK
For information on Fall & Injury Prevention, visit: [email or web address] OR  [email or web address] OR  https://www.cdc.gov/steadi/patient.html

## 2024-06-11 NOTE — DISCHARGE NOTE OB - IF YOU HAVE SEXUAL INTERCOURSE, PREGNANCY CAN OCCUR. THEREFORE, DISCUSS FAMILY PLANNING OPTIONS WITH YOUR HEALTHCARE PROVIDER
Head, normocephalic, atraumatic, Face, Face within normal limits, Ears, External ears within normal limits, Nose/Nasopharynx, External nose  normal appearance, nares patent, no nasal discharge, Mouth and Throat, Oral cavity appearance normal, Breath odor normal, Lips, Appearance normal Statement Selected

## 2024-06-11 NOTE — DISCHARGE NOTE OB - DISCHARGE DISPOSITION
Patient: Max Bal Date: 2022  : 1956   78year old male       Chief Complaint   Patient presents with   â¢ Office Visit     Follow up / needs POA forms today / Janne Goodell said to talk to md         Patient comes in today for follow-up of her chronic medical problems. Regarding hypertension, patient is taking medication as prescribed. Patient is watching salt intake. Is active. Denies any anginal-like symptoms. Denies any complications or side effects. Patient is currently stable on medical management. Regarding diabetes mellitus type 2, patient is taking his medication as prescribed. His fasting blood sugars generally less than 110. Patient denies any symptoms of high or low blood sugar. Denies any complications or side effects. He is currently stable on medical management. Regarding dyslipidemia, patient is taking medication as prescribed. Patient is watching his diet. Is active. Denies any myalgias muscle aches. Denies any tea colored urine. Denies any GI upset. He is currently stable on medical management. About a week ago, patient tripped and caught himself but he feels he jammed his right hip. However, patient says the pain has resolved and he is back to doing exercise/elliptical.    Patient had questions about power of  which were discussed and answered. Other chronic medical problems and preventive care discussed.       Review of Systems    ROS:    Symptoms include no fever, no chills, no generalized muscle aches, no anorexia, no nasal discharge, no nasal passage blockage, no cough, no wheezing, no dyspnea, no shortness of breath, no chest pain, no hoarseness, no sore throat, no headache, no acute vision changes, no nausea, no vomiting, no diarrhea, no constipation, no abdominal pain, no lower back pain, no dysuria, no change in urinary frequency, no feelings of urinary urgency, no pain, no rash, no suicidal or homicidal ideation, no arthralgias and no localized joint swelling unless mentioned above in hpi. All other systems reviewed and negative. Current Outpatient Medications   Medication Sig Dispense Refill   â¢ acetaminophen-codeine (TYLENOL NO.3) 300-30 MG per tablet      â¢ amoxicillin (AMOXIL) 500 MG capsule      â¢ losartan (COZAAR) 50 MG tablet Take 1 tablet by mouth 2 times daily. 180 tablet 0   â¢ aspirin 81 MG EC tablet Take 1 tablet by mouth daily. â¢ VITAMIN D PO      â¢ Cyanocobalamin (VITAMIN B12 PO)      â¢ atorvastatin (LIPITOR) 20 MG tablet Take 1 tablet by mouth at bedtime. 90 tablet 3   â¢ empagliflozin (Jardiance) 10 MG tablet Take 1 tablet by mouth daily (before breakfast). 90 tablet 3   â¢ metformin (GLUCOPHAGE) 1000 MG tablet Take 1 tablet by mouth 2 times daily. 180 tablet 3   â¢ MILK THISTLE PO Take by mouth 3 times daily. â¢ coenzyme Q10 100 MG capsule Take by mouth daily. No current facility-administered medications for this visit.        ALLERGIES:  No Known Allergies    Past Medical History:   Diagnosis Date   â¢ Age-related cataract of both eyes 1/21/2019   â¢ Benign essential hypertension 6/14/2014   â¢ Diabetes mellitus (CMS/HCC)    â¢ Dyslipidemia 2/16/2017   â¢ Elevated PSA    â¢ HCV antibody positive 4/13/2017   â¢ High cholesterol    â¢ History of blood transfusion 1980    no reaction   â¢ Obesity (BMI 30-39.9) 4/13/2017   â¢ Peripheral neuropathy 4/3/2018   â¢ Prostate cancer (CMS/HCC)    â¢ Rotator cuff tear arthropathy of right shoulder 10/4/2018   â¢ Seizures (CMS/HCC)     as an infant       Past Surgical History:   Procedure Laterality Date   â¢ Cataract extraction, bilateral     â¢ Colonoscopy     â¢ Dental surgery Bilateral     dental surgery with bone hernando   â¢ Eye surgery     â¢ Hip surgery Bilateral     patients was only 2years old    â¢ Rotator cuff repair     â¢ Tonsillectomy     â¢ Us guided prostate biopsy  04/05/2021 for PSA of 6.2    Prostatic adenocarcinoma, Veronica grade 3+3=6/10       Social History     Socioeconomic "History   â¢ Marital status: Single     Spouse name: Not on file   â¢ Number of children: Not on file   â¢ Years of education: Not on file   â¢ Highest education level: Not on file   Occupational History   â¢ Occupation: retired   â¢ Occupation: IT   Tobacco Use   â¢ Smoking status: Former Smoker     Years: 30.00   â¢ Smokeless tobacco: Never Used   Vaping Use   â¢ Vaping Use: never used   Substance and Sexual Activity   â¢ Alcohol use: Yes     Alcohol/week: 1.0 standard drink     Types: 1 Shots of liquor per week     Comment: 3 drinks weekly   â¢ Drug use: Not Currently     Types: Marijuana   â¢ Sexual activity: Not Currently   Other Topics Concern   â¢ Not on file   Social History Narrative    Social somewhat      Social Determinants of Health     Financial Resource Strain: Not on file   Food Insecurity: Not on file   Transportation Needs: Not on file   Physical Activity: Not on file   Stress: Not on file   Social Connections: Not on file   Intimate Partner Violence: Not on file       Family History   Problem Relation Age of Onset   â¢ Heart disease Mother    â¢ Hyperlipidemia Mother    â¢ Myocardial Infarction Father    â¢ Cancer, Prostate Father    â¢ Patient is unaware of any medical problems Sister        Visit Vitals  /62 (BP Location: LUE - Left upper extremity, Patient Position: Sitting, Cuff Size: Large Adult)   Temp 98.7 Â°F (37.1 Â°C) (Temporal)   Resp 18   Ht 5' 6"" (1.676 m)   Wt 93.4 kg (206 lb)   BMI 33.25 kg/mÂ²       Physical Exam  Constitutional:       General: He is not in acute distress. Appearance: He is well-developed. He is obese. He is not diaphoretic. HENT:      Head: Normocephalic and atraumatic. Eyes:      General: No scleral icterus. Right eye: No discharge. Left eye: No discharge. Conjunctiva/sclera: Conjunctivae normal.   Neck:      Trachea: No tracheal deviation. Cardiovascular:      Rate and Rhythm: Normal rate and regular rhythm. Heart sounds: Normal heart sounds.  " Pulmonary:      Effort: Pulmonary effort is normal.      Breath sounds: Normal breath sounds. No wheezing. Abdominal:      General: Bowel sounds are normal.      Palpations: Abdomen is soft. Tenderness: There is no abdominal tenderness. There is no rebound. Musculoskeletal:         General: No tenderness. Normal range of motion. Cervical back: Neck supple. Skin:     General: Skin is warm and dry. Findings: No erythema. Neurological:      Mental Status: He is alert and oriented to person, place, and time. Psychiatric:         Behavior: Behavior normal.         Judgment: Judgment normal.           Assessment and plan:    Benign essential hypertension  Blood pressure is doing okay. Continue current medical management. Encourage continued cardiovascular exercise and weight loss. Type 2 diabetes mellitus without complication, without long-term current use of insulin (CMS/HCC)  Continue current medical management. A1c has been controlled. We will repeat A1c with next blood draw. Patient is on statin therapy. Patient is on ARB inhibitor. Patient reminded to follow-up with ophthalmology this month.    - GLYCOHEMOGLOBIN; Future    Dyslipidemia  Patient is on statin therapy and tolerating that fine. He will continue benefit from it given his underlying comorbidities. Malignant neoplasm of prostate (CMS/HCC)  Patient is following up with urology. Currently under surveillance. He is getting another PSA. Elevated PSA  See above. HCV antibody positive  No viral load detected. Currently not on medication. Patient seen by GI. Peripheral polyneuropathy  Patient has symptoms on and off. He says he has not gotten worse. Manageable without medications. Former smoker  Abdominal aortic aneurysm ultrasound order due to patient's history of smoking.    - [facility] AAA; Future    Healthcare maintenance  Age-appropriate immunizations recommendations made.   Abdominal aortic aneurysm ultrasound ordered due to patient's history of smoking and age. Health Maintenance Summary     Abdominal Aortic Aneurysm (AAA) Screening (Once)  Ordered on 7/11/2022    Diabetes Eye Exam (Yearly)  Due soon on 7/19/2022    COVID-19 Vaccine (4 - Booster for Love Boatman series)  Next due on 8/20/2022    Influenza Vaccine (1)  Next due on 9/1/2022    Diabetes A1C (Every 6 Months)  Ordered on 7/11/2022    Pneumococcal Vaccine 65+ (2 - PPSV23 or PCV20)  Next due on 12/22/2022    Diabetes Foot Exam (Yearly)  Next due on 4/4/2023    DM/CKD GFR (Yearly)  Next due on 4/5/2023    Depression Screening (Yearly)  Next due on 7/11/2023    Colonoscopy Risk (Every 5 Years)  Next due on 8/14/2025    DTaP/Tdap/Td Vaccine (2 - Td or Tdap)  Next due on 4/3/2028    Hepatitis B Vaccine   Aged Out    Medicare Advantage- Medicare Wellness Visit   Completed    Shingles Vaccine   Completed    Meningococcal Vaccine   Aged Out    HPV Vaccine   Aged Out          - [facility] SCREENING AAA; Future            Return in about 3 months (around 10/11/2022) for Chronic medical problem follow-up, lab follow up.     Mariangel Nina MD  [address] Home/with Baby

## 2024-06-11 NOTE — DISCHARGE NOTE OB - PATIENT PORTAL LINK FT
You can access the FollowMyHealth Patient Portal offered by [facility] by registering at the following website: [email or web address]. By joining [employer]’s FollowMyHealth portal, you will also be able to view your health information using other applications (apps) compatible with our system.

## 2024-06-12 RX ADMIN — Medication 600 MILLIGRAM(S): at 20:10

## 2024-06-12 RX ADMIN — Medication 600 MILLIGRAM(S): at 00:15

## 2024-06-12 RX ADMIN — ENOXAPARIN SODIUM 40 MILLIGRAM(S): 100 INJECTION SUBCUTANEOUS at 06:05

## 2024-06-12 RX ADMIN — Medication 975 MILLIGRAM(S): at 10:54

## 2024-06-12 RX ADMIN — Medication 325 MILLIGRAM(S): at 11:50

## 2024-06-12 RX ADMIN — FAMOTIDINE 20 MILLIGRAM(S): 10 INJECTION INTRAVENOUS at 06:05

## 2024-06-12 RX ADMIN — Medication 975 MILLIGRAM(S): at 04:21

## 2024-06-12 RX ADMIN — Medication 1 MILLIGRAM(S): at 11:50

## 2024-06-12 RX ADMIN — SIMETHICONE 80 MILLIGRAM(S): 80 TABLET, CHEWABLE ORAL at 22:55

## 2024-06-12 RX ADMIN — Medication 975 MILLIGRAM(S): at 03:38

## 2024-06-12 RX ADMIN — Medication 975 MILLIGRAM(S): at 09:54

## 2024-06-12 RX ADMIN — Medication 600 MILLIGRAM(S): at 07:00

## 2024-06-12 RX ADMIN — FAMOTIDINE 20 MILLIGRAM(S): 10 INJECTION INTRAVENOUS at 18:20

## 2024-06-12 RX ADMIN — Medication 975 MILLIGRAM(S): at 22:56

## 2024-06-12 RX ADMIN — Medication 600 MILLIGRAM(S): at 19:08

## 2024-06-12 RX ADMIN — Medication 600 MILLIGRAM(S): at 06:05

## 2024-06-12 RX ADMIN — Medication 1 TABLET(S): at 11:50

## 2024-06-12 RX ADMIN — Medication 600 MILLIGRAM(S): at 11:50

## 2024-06-12 RX ADMIN — Medication 975 MILLIGRAM(S): at 23:55

## 2024-06-12 RX ADMIN — Medication 600 MILLIGRAM(S): at 12:50

## 2024-06-12 NOTE — PROGRESS NOTE ADULT - PROBLEM SELECTOR PLAN 2
take iron, folic acid, vitamin C, and prenatal vitamins. eat iron fortified food
-- s/p code fusion, s/p 2 units prbcs

## 2024-06-12 NOTE — PROGRESS NOTE ADULT - PROBLEM SELECTOR PLAN 1
-- Heparin for dvt prophylaxis  -- OOB  -- incentive spirometry  -- continue pain mgmt  --continue post op care
-Pain management as needed  -OOB and ambulate  -f/u Rpt CBC   -f/u void  -Advance diet to regular  -Encourage breastfeeding   -d/w Dr Galindo

## 2024-06-12 NOTE — PROGRESS NOTE ADULT - ASSESSMENT
A/P: POD #1 s/p poonam c/s; code fusion; acute blood loss anemia asymptomatic 
A/P:  33y POD # 2 s/p primary  @ 41weeks for failure to progress, pph, acute blood loss anemia, currently in stable condition  -- s/p code fusion, s/p 2 units prbcs  --  s/p Methergine x 1 dose  -- Heparin for dvt prophylaxis  -- OOB  -- incentive spirometry  -- continue pain mgmt  --continue post op care  Dr Galindo aware

## 2024-06-13 VITALS
TEMPERATURE: 99 F | DIASTOLIC BLOOD PRESSURE: 90 MMHG | HEART RATE: 98 BPM | OXYGEN SATURATION: 100 % | SYSTOLIC BLOOD PRESSURE: 122 MMHG | RESPIRATION RATE: 16 BRPM

## 2024-06-13 LAB
BASOPHILS # BLD AUTO: 0.03 K/UL — SIGNIFICANT CHANGE UP (ref 0–0.2)
BASOPHILS NFR BLD AUTO: 0.4 % — SIGNIFICANT CHANGE UP (ref 0–2)
EOSINOPHIL # BLD AUTO: 0.42 K/UL — SIGNIFICANT CHANGE UP (ref 0–0.5)
EOSINOPHIL NFR BLD AUTO: 6.1 % — HIGH (ref 0–6)
HCT VFR BLD CALC: 25.7 % — LOW (ref 34.5–45)
HGB BLD-MCNC: 8.6 G/DL — LOW (ref 11.5–15.5)
IMM GRANULOCYTES NFR BLD AUTO: 0.4 % — SIGNIFICANT CHANGE UP (ref 0–0.9)
LYMPHOCYTES # BLD AUTO: 1.38 K/UL — SIGNIFICANT CHANGE UP (ref 1–3.3)
LYMPHOCYTES # BLD AUTO: 20 % — SIGNIFICANT CHANGE UP (ref 13–44)
MCHC RBC-ENTMCNC: 29.8 PG — SIGNIFICANT CHANGE UP (ref 27–34)
MCHC RBC-ENTMCNC: 33.5 GM/DL — SIGNIFICANT CHANGE UP (ref 32–36)
MCV RBC AUTO: 88.9 FL — SIGNIFICANT CHANGE UP (ref 80–100)
MONOCYTES # BLD AUTO: 0.28 K/UL — SIGNIFICANT CHANGE UP (ref 0–0.9)
MONOCYTES NFR BLD AUTO: 4.1 % — SIGNIFICANT CHANGE UP (ref 2–14)
NEUTROPHILS # BLD AUTO: 4.77 K/UL — SIGNIFICANT CHANGE UP (ref 1.8–7.4)
NEUTROPHILS NFR BLD AUTO: 69 % — SIGNIFICANT CHANGE UP (ref 43–77)
NRBC # BLD: 0 /100 WBCS — SIGNIFICANT CHANGE UP (ref 0–0)
PLATELET # BLD AUTO: 156 K/UL — SIGNIFICANT CHANGE UP (ref 150–400)
RBC # BLD: 2.89 M/UL — LOW (ref 3.8–5.2)
RBC # FLD: 15.6 % — HIGH (ref 10.3–14.5)
WBC # BLD: 6.91 K/UL — SIGNIFICANT CHANGE UP (ref 3.8–10.5)
WBC # FLD AUTO: 6.91 K/UL — SIGNIFICANT CHANGE UP (ref 3.8–10.5)

## 2024-06-13 PROCEDURE — 36415 COLL VENOUS BLD VENIPUNCTURE: CPT

## 2024-06-13 PROCEDURE — 86762 RUBELLA ANTIBODY: CPT

## 2024-06-13 PROCEDURE — 85730 THROMBOPLASTIN TIME PARTIAL: CPT

## 2024-06-13 PROCEDURE — 85610 PROTHROMBIN TIME: CPT

## 2024-06-13 PROCEDURE — 85025 COMPLETE CBC W/AUTO DIFF WBC: CPT

## 2024-06-13 PROCEDURE — 85384 FIBRINOGEN ACTIVITY: CPT

## 2024-06-13 PROCEDURE — 86780 TREPONEMA PALLIDUM: CPT

## 2024-06-13 PROCEDURE — 86900 BLOOD TYPING SEROLOGIC ABO: CPT

## 2024-06-13 PROCEDURE — 85027 COMPLETE CBC AUTOMATED: CPT

## 2024-06-13 PROCEDURE — 86901 BLOOD TYPING SEROLOGIC RH(D): CPT

## 2024-06-13 PROCEDURE — P9040: CPT

## 2024-06-13 PROCEDURE — 36430 TRANSFUSION BLD/BLD COMPNT: CPT

## 2024-06-13 PROCEDURE — 86923 COMPATIBILITY TEST ELECTRIC: CPT

## 2024-06-13 PROCEDURE — 86850 RBC ANTIBODY SCREEN: CPT

## 2024-06-13 RX ORDER — FERROUS SULFATE 325(65) MG
1 TABLET ORAL
Qty: 60 | Refills: 0
Start: 2024-06-13

## 2024-06-13 RX ORDER — IBUPROFEN 200 MG
1 TABLET ORAL
Qty: 30 | Refills: 0
Start: 2024-06-13

## 2024-06-13 RX ORDER — ASCORBIC ACID 60 MG
1 TABLET,CHEWABLE ORAL
Qty: 30 | Refills: 0
Start: 2024-06-13

## 2024-06-13 RX ORDER — DOCUSATE SODIUM 100 MG
1 CAPSULE ORAL
Qty: 30 | Refills: 0
Start: 2024-06-13

## 2024-06-13 RX ORDER — SIMETHICONE 80 MG/1
1 TABLET, CHEWABLE ORAL
Qty: 20 | Refills: 0
Start: 2024-06-13

## 2024-06-13 RX ORDER — ACETAMINOPHEN 500 MG
2 TABLET ORAL
Qty: 30 | Refills: 0
Start: 2024-06-13

## 2024-06-13 RX ADMIN — Medication 1 TABLET(S): at 11:32

## 2024-06-13 RX ADMIN — Medication 325 MILLIGRAM(S): at 11:32

## 2024-06-13 RX ADMIN — SIMETHICONE 80 MILLIGRAM(S): 80 TABLET, CHEWABLE ORAL at 05:57

## 2024-06-13 RX ADMIN — MAGNESIUM HYDROXIDE 30 MILLILITER(S): 400 TABLET, CHEWABLE ORAL at 10:52

## 2024-06-13 RX ADMIN — Medication 600 MILLIGRAM(S): at 02:35

## 2024-06-13 RX ADMIN — Medication 600 MILLIGRAM(S): at 11:32

## 2024-06-13 RX ADMIN — Medication 600 MILLIGRAM(S): at 07:00

## 2024-06-13 RX ADMIN — Medication 600 MILLIGRAM(S): at 01:35

## 2024-06-13 RX ADMIN — Medication 600 MILLIGRAM(S): at 12:32

## 2024-06-13 RX ADMIN — Medication 1 MILLIGRAM(S): at 11:32

## 2024-06-13 RX ADMIN — Medication 600 MILLIGRAM(S): at 05:57

## 2024-06-13 RX ADMIN — ENOXAPARIN SODIUM 40 MILLIGRAM(S): 100 INJECTION SUBCUTANEOUS at 05:55

## 2024-06-13 RX ADMIN — FAMOTIDINE 20 MILLIGRAM(S): 10 INJECTION INTRAVENOUS at 05:57

## 2024-06-13 NOTE — LACTATION INITIAL EVALUATION - LACTATION INTERVENTIONS
baby remains in nursery at pt's request due to pain; pt. received pain medication recentlyh; Breastfeeding on cue 8-12X/24 hours with diaper count to assess for adequate intake, safe skin to skin and rooming-in encouraged./reviewed benefits and recommendations for rooming in/reviewed feeding on demand/by cue at least 8 times a day
pt. with very large nipples; baby with difficulty latching per pt. and RN; discussed positionin g and latch techniques and use of Breast pump and hand expression for stimulation and expression; reviewed safe formula feeding; Breastfeeding on cue 8-12X/24 hours with diaper count to assess for adequate intake, safe skin to skin and rooming-in encouraged./initiate/review hand expression/initiate/review pumping guidelines and safe milk handling/initiate/review supplementation plan due to medical indications/review techniques to increase milk supply/reviewed benefits and recommendations for rooming in/reviewed feeding on demand/by cue at least 8 times a day
pt. prefers to pump at home "once milk comes" and provide her EHM to baby; has been formujla feeding; reviewed hand expression/pumping inst.; Breastfeeding on cue 8-12X/24 hours with diaper count to assess for adequate intake, safe skin to skin and rooming-in encouraged. attended mother-baby breastfeeding and d/c class this morning; pt's questions addressed; declined Referral to Tele-lactation/initiate/review hand expression/post discharge community resources provided/review techniques to increase milk supply/review techniques to manage sore nipples/engorgement/reviewed components of an effective feeding and at least 8 effective feedings per day required/reviewed importance of monitoring infant diapers, the breastfeeding log, and minimum output each day/reviewed benefits and recommendations for rooming in/reviewed feeding on demand/by cue at least 8 times a day/reviewed indications of inadequate milk transfer that would require supplementation

## 2024-06-13 NOTE — LACTATION INITIAL EVALUATION - AS DELIV COMPLICATIONS OB
post term; code fusion; see delivery summary

## 2024-06-13 NOTE — LACTATION INITIAL EVALUATION - INTERVENTION OUTCOME
verbalizes understanding/demonstrates understanding of teaching/needs met
verbalizes understanding/Lactation team to follow up
verbalizes understanding/demonstrates understanding of teaching/Lactation team to follow up

## 2024-06-13 NOTE — LACTATION INITIAL EVALUATION - POTENTIAL FOR
knowledge deficit/latch on difficulty/delayed secretory activation
knowledge deficit/latch on difficulty
knowledge deficit

## 2024-06-13 NOTE — PROGRESS NOTE ADULT - SUBJECTIVE AND OBJECTIVE BOX
Post Op Note    Patient seen resting comfortably.  No new complaints.  Denies HA, CP, SOB, N/V/D, dizziness, palpitations, worsening abdominal pain, worsening vaginal bleeding, or any other concerns.  2nd unit of PRBC running for ABLA.     Vital Signs Last 24 Hrs  T(C): 36.8 (10 Oliver 2024 20:45), Max: 37.5 (10 Oliver 2024 18:15)  T(F): 98.2 (10 Oliver 2024 20:45), Max: 99.5 (10 Oliver 2024 18:15)  HR: 87 (10 Oliver 2024 21:00) (78 - 108)  BP: 113/64 (10 Oliver 2024 21:00) (75/47 - 118/64)  RR: 17 (10 Olievr 2024 21:00) (16 - 18)  SpO2: 97% (10 Oliver 2024 21:00) (96% - 100%)        Gen: A&O x 3, NAD  Chest: CTABL  Cardiac: S1+S2+ RRR  Breast: Soft, nontender, nonengorged  Abdomen: Nontender, nondistended, +BS, Dressing C/D/I  Gyn: Minimal bleeding  Extremities: Nontender, DTRS 2+, no worsening edema, venodynes intact                          8.5    13.26 )-----------( 138      ( 10 Oliver 2024 17:20 )             25.3       A/P:  Patient s/p  section, POD #0 with code fusion, ABLA - currently stable with 2nd unit PRBC running.     -Pain management as needed  -Advance as per protocol  -OOB and ambulate in am  -Repeat CBC post transfusion   -DC collins and trial of void in am  -Advance diet with flatus  -Encourage breastfeeding 
Patient seen resting comfortably.  No new complaints.  Denies HA, CP, SOB, N/V/D, dizziness, palpitations, worsening abdominal pain, worsening vaginal bleeding, or any other concerns. + Ambulation, + void without difficulty, + flatus and tolerating regular diet. Attempting breastfeeding.     Vital Signs Last 24 Hrs  T(C): 36.8 (2024 06:08), Max: 37 (2024 14:50)  T(F): 98.3 (2024 06:08), Max: 98.6 (2024 14:50)  HR: 68 (2024 06:08) (60 - 74)  BP: 104/65 (2024 06:08) (100/58 - 110/60)  BP(mean): 79 (2024 22:01) (77 - 79)  RR: 16 (2024 06:08) (16 - 18)  SpO2: 99% (2024 06:08) (98% - 100%)    Parameters below as of 2024 06:08  Patient On (Oxygen Delivery Method): room air        Gen: A&O x 3, NAD  Chest: CTABL  Cardiac: S1+S2+ RRR  Breast: Soft, nontender, nonengorged  Abdomen: Nontender, nondistended, +BS, Incision clean dry and intact with steris in place   Gyn: Minimal bleeding  Extremities: Nontender, DTRS 2+, no worsening edema                          8.6    6.91  )-----------( 156      ( 2024 06:25 )             25.7         A/P:  Patient s/p  section, POD #3.  Stable for discharge with acute blood loss anemia.    -Discharge home with instructions given  -Ambulate daily  -Pain management as needed  -Encourage breastfeeding  -Follow up in office in 2 weeks for incision check    Attending aware     
Patient seen at bedside resting comfortably offers no new complaints. not yet ambulating, collins just removed no void spontaneously yet.  + flatus;  no bm; tolerating clr liq diet. both breast and bottle feeding. Denies HA, blurry vision or epigastric pain, CP, SOB, N/V/D,  dizziness, palpitations, worsening vaginal bleeding.    Vital Signs Last 24 Hrs  T(C): 36.8 (11 Jun 2024 08:57), Max: 37.5 (10 Oliver 2024 18:15)  T(F): 98.2 (11 Jun 2024 08:57), Max: 99.5 (10 Oliver 2024 18:15)  HR: 84 (11 Jun 2024 08:57) (72 - 108)  BP: 107/60 (11 Jun 2024 08:57) (75/47 - 118/64)  BP(mean): 86 (10 Oliver 2024 21:54) (86 - 86)  RR: 16 (11 Jun 2024 08:57) (16 - 18)  SpO2: 98% (11 Jun 2024 08:57) (96% - 100%)    Parameters below as of 11 Jun 2024 08:57  Patient On (Oxygen Delivery Method): room air      Gen: A&O x 3, NAD  Chest: CTA B/L  Cardiac: S1,S2  RRR  Breast: Soft, nontender, nonengorged  Abdomen: +BS; soft; Nontender, nondistended; dressing removed incision C/D/I steri strips in place  Gyn: minimal lochia   Extremities: Nontender, venodynes in place                          9.7    11.89 )-----------( 119      ( 11 Jun 2024 06:45 )             28.2       
OBGYN PA NOTE POD2  Patient seen and evaluated at bedside,  resting comfortably w/o any acute  complaints.  Denies fever, HA, CP, SOB, N/V/D, dizziness, palpitations, worsening abdominal pain, worsening vaginal bleeding, or any other concerns.  Ambulating and voiding without difficulty.  Passing flatus and tolerating regular diet.  Attempting to breastfeed.     Vital Signs Last 24 Hrs  T(C): 36.4 (12 Jun 2024 06:21), Max: 37.3 (11 Jun 2024 20:01)  T(F): 97.5 (12 Jun 2024 06:21), Max: 99.2 (11 Jun 2024 20:01)  HR: 71 (12 Jun 2024 06:21) (71 - 82)  BP: 104/64 (12 Jun 2024 06:21) (104/64 - 106/66)  BP(mean): 79 (11 Jun 2024 20:01) (79 - 79)  RR: 16 (12 Jun 2024 06:21) (16 - 18)  SpO2: 99% (12 Jun 2024 06:21) (97% - 99%)    Parameters below as of 12 Jun 2024 06:21  Patient On (Oxygen Delivery Method): room air        Physical Exam:     Gen: A&Ox 3, NAD  Chest: CTAB/L  Cardiac: S1+S2+ RRR  Breast: Soft, nontender, nonengorged  Abdomen: Soft, nontender, Fundus firm below umbilicus, +BS. incision clean, dry and intact  Gyn: Mild lochia,   Extremities: Nontender, DTRS 2+, no worsening edema                          9.7    11.89 )-----------( 119      ( 11 Jun 2024 06:45 )             28.2

## 2024-06-13 NOTE — LACTATION INITIAL EVALUATION - NS LACT CON REASON FOR REQ
primaparous mom
general questions without assessment/primaparous mom/follow up consultation
no latch x24 hours/primaparous mom

## 2025-06-08 ENCOUNTER — EMERGENCY (EMERGENCY)
Facility: HOSPITAL | Age: 35
LOS: 1 days | End: 2025-06-08
Attending: EMERGENCY MEDICINE
Payer: MEDICAID

## 2025-06-08 VITALS
SYSTOLIC BLOOD PRESSURE: 100 MMHG | HEART RATE: 73 BPM | HEIGHT: 60 IN | RESPIRATION RATE: 18 BRPM | OXYGEN SATURATION: 100 % | TEMPERATURE: 98 F | DIASTOLIC BLOOD PRESSURE: 60 MMHG | WEIGHT: 129.63 LBS

## 2025-06-08 VITALS
SYSTOLIC BLOOD PRESSURE: 109 MMHG | OXYGEN SATURATION: 100 % | HEART RATE: 69 BPM | DIASTOLIC BLOOD PRESSURE: 70 MMHG | RESPIRATION RATE: 18 BRPM

## 2025-06-08 LAB
ALBUMIN SERPL ELPH-MCNC: 3.6 G/DL — SIGNIFICANT CHANGE UP (ref 3.5–5)
ALP SERPL-CCNC: 87 U/L — SIGNIFICANT CHANGE UP (ref 40–120)
ALT FLD-CCNC: 24 U/L DA — SIGNIFICANT CHANGE UP (ref 10–60)
ANION GAP SERPL CALC-SCNC: 4 MMOL/L — LOW (ref 5–17)
AST SERPL-CCNC: 16 U/L — SIGNIFICANT CHANGE UP (ref 10–40)
BASOPHILS # BLD AUTO: 0.03 K/UL — SIGNIFICANT CHANGE UP (ref 0–0.2)
BASOPHILS NFR BLD AUTO: 0.6 % — SIGNIFICANT CHANGE UP (ref 0–2)
BILIRUB SERPL-MCNC: 0.4 MG/DL — SIGNIFICANT CHANGE UP (ref 0.2–1.2)
BUN SERPL-MCNC: 10 MG/DL — SIGNIFICANT CHANGE UP (ref 7–18)
CALCIUM SERPL-MCNC: 9.1 MG/DL — SIGNIFICANT CHANGE UP (ref 8.4–10.5)
CHLORIDE SERPL-SCNC: 107 MMOL/L — SIGNIFICANT CHANGE UP (ref 96–108)
CO2 SERPL-SCNC: 26 MMOL/L — SIGNIFICANT CHANGE UP (ref 22–31)
CREAT SERPL-MCNC: 0.59 MG/DL — SIGNIFICANT CHANGE UP (ref 0.5–1.3)
EGFR: 120 ML/MIN/1.73M2 — SIGNIFICANT CHANGE UP
EGFR: 120 ML/MIN/1.73M2 — SIGNIFICANT CHANGE UP
EOSINOPHIL # BLD AUTO: 0.21 K/UL — SIGNIFICANT CHANGE UP (ref 0–0.5)
EOSINOPHIL NFR BLD AUTO: 4.1 % — SIGNIFICANT CHANGE UP (ref 0–6)
GLUCOSE SERPL-MCNC: 98 MG/DL — SIGNIFICANT CHANGE UP (ref 70–99)
HCG SERPL-ACNC: <1 MIU/ML — SIGNIFICANT CHANGE UP
HCT VFR BLD CALC: 36.7 % — SIGNIFICANT CHANGE UP (ref 34.5–45)
HGB BLD-MCNC: 12.1 G/DL — SIGNIFICANT CHANGE UP (ref 11.5–15.5)
IMM GRANULOCYTES NFR BLD AUTO: 0.2 % — SIGNIFICANT CHANGE UP (ref 0–0.9)
LIDOCAIN IGE QN: 31 U/L — SIGNIFICANT CHANGE UP (ref 13–75)
LYMPHOCYTES # BLD AUTO: 2.21 K/UL — SIGNIFICANT CHANGE UP (ref 1–3.3)
LYMPHOCYTES # BLD AUTO: 43.1 % — SIGNIFICANT CHANGE UP (ref 13–44)
MAGNESIUM SERPL-MCNC: 2.2 MG/DL — SIGNIFICANT CHANGE UP (ref 1.6–2.6)
MCHC RBC-ENTMCNC: 28.6 PG — SIGNIFICANT CHANGE UP (ref 27–34)
MCHC RBC-ENTMCNC: 33 G/DL — SIGNIFICANT CHANGE UP (ref 32–36)
MCV RBC AUTO: 86.8 FL — SIGNIFICANT CHANGE UP (ref 80–100)
MONOCYTES # BLD AUTO: 0.27 K/UL — SIGNIFICANT CHANGE UP (ref 0–0.9)
MONOCYTES NFR BLD AUTO: 5.3 % — SIGNIFICANT CHANGE UP (ref 2–14)
NEUTROPHILS # BLD AUTO: 2.4 K/UL — SIGNIFICANT CHANGE UP (ref 1.8–7.4)
NEUTROPHILS NFR BLD AUTO: 46.7 % — SIGNIFICANT CHANGE UP (ref 43–77)
NRBC BLD AUTO-RTO: 0 /100 WBCS — SIGNIFICANT CHANGE UP (ref 0–0)
PLATELET # BLD AUTO: 193 K/UL — SIGNIFICANT CHANGE UP (ref 150–400)
POTASSIUM SERPL-MCNC: 3.9 MMOL/L — SIGNIFICANT CHANGE UP (ref 3.5–5.3)
POTASSIUM SERPL-SCNC: 3.9 MMOL/L — SIGNIFICANT CHANGE UP (ref 3.5–5.3)
PROT SERPL-MCNC: 7.4 G/DL — SIGNIFICANT CHANGE UP (ref 6–8.3)
RBC # BLD: 4.23 M/UL — SIGNIFICANT CHANGE UP (ref 3.8–5.2)
RBC # FLD: 13.3 % — SIGNIFICANT CHANGE UP (ref 10.3–14.5)
SODIUM SERPL-SCNC: 137 MMOL/L — SIGNIFICANT CHANGE UP (ref 135–145)
WBC # BLD: 5.13 K/UL — SIGNIFICANT CHANGE UP (ref 3.8–10.5)
WBC # FLD AUTO: 5.13 K/UL — SIGNIFICANT CHANGE UP (ref 3.8–10.5)

## 2025-06-08 PROCEDURE — 99284 EMERGENCY DEPT VISIT MOD MDM: CPT

## 2025-06-08 PROCEDURE — 83735 ASSAY OF MAGNESIUM: CPT

## 2025-06-08 PROCEDURE — 76705 ECHO EXAM OF ABDOMEN: CPT | Mod: 26

## 2025-06-08 PROCEDURE — 96374 THER/PROPH/DIAG INJ IV PUSH: CPT

## 2025-06-08 PROCEDURE — 96375 TX/PRO/DX INJ NEW DRUG ADDON: CPT

## 2025-06-08 PROCEDURE — 85025 COMPLETE CBC W/AUTO DIFF WBC: CPT

## 2025-06-08 PROCEDURE — 99285 EMERGENCY DEPT VISIT HI MDM: CPT | Mod: 25

## 2025-06-08 PROCEDURE — 84702 CHORIONIC GONADOTROPIN TEST: CPT

## 2025-06-08 PROCEDURE — 80053 COMPREHEN METABOLIC PANEL: CPT

## 2025-06-08 PROCEDURE — 36415 COLL VENOUS BLD VENIPUNCTURE: CPT

## 2025-06-08 PROCEDURE — 93005 ELECTROCARDIOGRAM TRACING: CPT

## 2025-06-08 PROCEDURE — 83690 ASSAY OF LIPASE: CPT

## 2025-06-08 PROCEDURE — 76705 ECHO EXAM OF ABDOMEN: CPT

## 2025-06-08 RX ORDER — MAG HYDROX/ALUMINUM HYD/SIMETH 200-200-20
10 SUSPENSION, ORAL (FINAL DOSE FORM) ORAL
Qty: 280 | Refills: 0
Start: 2025-06-08 | End: 2025-06-14

## 2025-06-08 RX ORDER — ACETAMINOPHEN 500 MG/5ML
1000 LIQUID (ML) ORAL ONCE
Refills: 0 | Status: COMPLETED | OUTPATIENT
Start: 2025-06-08 | End: 2025-06-08

## 2025-06-08 RX ORDER — SUCRALFATE 1 G
1 TABLET ORAL ONCE
Refills: 0 | Status: COMPLETED | OUTPATIENT
Start: 2025-06-08 | End: 2025-06-08

## 2025-06-08 RX ORDER — MAGNESIUM, ALUMINUM HYDROXIDE 200-200 MG
30 TABLET,CHEWABLE ORAL ONCE
Refills: 0 | Status: COMPLETED | OUTPATIENT
Start: 2025-06-08 | End: 2025-06-08

## 2025-06-08 RX ADMIN — Medication 1 GRAM(S): at 09:10

## 2025-06-08 RX ADMIN — Medication 20 MILLIGRAM(S): at 09:11

## 2025-06-08 RX ADMIN — Medication 1000 MILLIGRAM(S): at 09:50

## 2025-06-08 RX ADMIN — Medication 30 MILLILITER(S): at 09:10

## 2025-06-08 RX ADMIN — Medication 400 MILLIGRAM(S): at 09:12

## 2025-06-08 NOTE — ED ADULT NURSE NOTE - OBJECTIVE STATEMENT
pt c/o constant epigastric pain radiating to left flank x2 days. pt denies nausea/vomiting/ diarrhea/ constipation. pt pmh: denies. pt last meal: 6/7/25 2100 chicken soup and rice. pt lmp: 5/25/25. pt took tylenol today 0300 to no relief.

## 2025-06-08 NOTE — ED ADULT TRIAGE NOTE - CHIEF COMPLAINT QUOTE
Pt c/o epigastric pain radiating to the left upper back x 2 days. Pt denies any N/V/D, fevers or chest pain.

## 2025-06-08 NOTE — ED PROVIDER NOTE - PROGRESS NOTE DETAILS
labs are unremarkable. us abdomen negative for acute pathology but with GB polyp- pt states already knew about this poly. gastritis vs. msk pain. will dc with GI meds. f/u with PMD. return precautions discussed.

## 2025-06-08 NOTE — ED PROVIDER NOTE - OBJECTIVE STATEMENT
34-year-old female denies past medical history coming in with 2-day history of upper abdominal discomfort.  Mostly in the center as well as towards the left side.  Occasionally radiates towards her back.  Never had symptoms like this before initially thought it was musculoskeletal pain because it started after she had a long workday but pain is persisted came to the ED for evaluation.  Denies nausea, vomiting, diarrhea, urinary complaints.  History of  previously.  Denies other complaints at this time.

## 2025-06-08 NOTE — ED PROVIDER NOTE - PATIENT PORTAL LINK FT
You can access the FollowMyHealth Patient Portal offered by [facility] by registering at the following website: [email or web address]. By joining [employer]’s FollowMyHealth portal, you will also be able to view your health information using other applications (apps) compatible with our system.

## 2025-06-08 NOTE — ED PROVIDER NOTE - CLINICAL SUMMARY MEDICAL DECISION MAKING FREE TEXT BOX
34-year-old female presents for upper abdominal pain.  PE as above.    Labs, right upper quadrant ultrasound, GI cocktail, reassessment

## 2025-06-08 NOTE — ED PROVIDER NOTE - IV ALTEPLASE DOOR HIDDEN
· Symptoms are currently stable  · Continue Tums and Pepcid  · During previous visit patient had FU manic episodes that he had reported as chronic and intermittent  · Continue to monitor  show